# Patient Record
Sex: FEMALE | Race: BLACK OR AFRICAN AMERICAN | NOT HISPANIC OR LATINO
[De-identification: names, ages, dates, MRNs, and addresses within clinical notes are randomized per-mention and may not be internally consistent; named-entity substitution may affect disease eponyms.]

---

## 2021-08-07 ENCOUNTER — TRANSCRIPTION ENCOUNTER (OUTPATIENT)
Age: 51
End: 2021-08-07

## 2023-11-02 ENCOUNTER — EMERGENCY (EMERGENCY)
Facility: HOSPITAL | Age: 53
LOS: 1 days | Discharge: HOME | End: 2023-11-02
Attending: EMERGENCY MEDICINE | Admitting: EMERGENCY MEDICINE
Payer: COMMERCIAL

## 2023-11-02 VITALS
RESPIRATION RATE: 20 BRPM | OXYGEN SATURATION: 98 % | TEMPERATURE: 98 F | SYSTOLIC BLOOD PRESSURE: 125 MMHG | HEART RATE: 66 BPM | DIASTOLIC BLOOD PRESSURE: 74 MMHG

## 2023-11-02 VITALS
HEIGHT: 61 IN | RESPIRATION RATE: 17 BRPM | DIASTOLIC BLOOD PRESSURE: 83 MMHG | HEART RATE: 72 BPM | WEIGHT: 210.1 LBS | SYSTOLIC BLOOD PRESSURE: 160 MMHG | TEMPERATURE: 98 F | OXYGEN SATURATION: 98 %

## 2023-11-02 DIAGNOSIS — R07.89 OTHER CHEST PAIN: ICD-10-CM

## 2023-11-02 DIAGNOSIS — D64.9 ANEMIA, UNSPECIFIED: ICD-10-CM

## 2023-11-02 DIAGNOSIS — Z88.1 ALLERGY STATUS TO OTHER ANTIBIOTIC AGENTS STATUS: ICD-10-CM

## 2023-11-02 DIAGNOSIS — M32.9 SYSTEMIC LUPUS ERYTHEMATOSUS, UNSPECIFIED: ICD-10-CM

## 2023-11-02 PROCEDURE — 85379 FIBRIN DEGRADATION QUANT: CPT

## 2023-11-02 PROCEDURE — 99285 EMERGENCY DEPT VISIT HI MDM: CPT

## 2023-11-02 PROCEDURE — 71045 X-RAY EXAM CHEST 1 VIEW: CPT | Mod: 26

## 2023-11-02 PROCEDURE — 36415 COLL VENOUS BLD VENIPUNCTURE: CPT

## 2023-11-02 PROCEDURE — 99284 EMERGENCY DEPT VISIT MOD MDM: CPT | Mod: 25

## 2023-11-02 PROCEDURE — 80053 COMPREHEN METABOLIC PANEL: CPT

## 2023-11-02 PROCEDURE — 85025 COMPLETE CBC W/AUTO DIFF WBC: CPT

## 2023-11-02 PROCEDURE — 84484 ASSAY OF TROPONIN QUANT: CPT

## 2023-11-02 PROCEDURE — 71045 X-RAY EXAM CHEST 1 VIEW: CPT

## 2023-11-02 RX ORDER — IBUPROFEN 200 MG
600 TABLET ORAL ONCE
Refills: 0 | Status: COMPLETED | OUTPATIENT
Start: 2023-11-02 | End: 2023-11-02

## 2023-11-02 RX ADMIN — Medication 600 MILLIGRAM(S): at 09:35

## 2023-11-02 NOTE — ED ADULT TRIAGE NOTE - CHIEF COMPLAINT QUOTE
Pt presents' with c/o "chest pain", to L ACW since 0300, with radiation to jaw and under left breast per pt. States "it feels like a fluttering or like something is heavy on my chest. Denies any N/V or SOB. Respiratory effort WNL. Recent dx of Lupus 1/2023. Pt presents' with c/o "chest pain", to L ACW since 0300, with radiation to jaw and under left breast per pt. States "it feels like a fluttering or like something is heavy on my chest". Denies any N/V or SOB. Respiratory effort WNL. Recent dx of Lupus 1/2023.

## 2023-11-02 NOTE — ED ADULT NURSE NOTE - NSFALLUNIVINTERV_ED_ALL_ED
Bed/Stretcher in lowest position, wheels locked, appropriate side rails in place/Call bell, personal items and telephone in reach/Instruct patient to call for assistance before getting out of bed/chair/stretcher/Non-slip footwear applied when patient is off stretcher/[place] to call system/Physically safe environment - no spills, clutter or unnecessary equipment/Purposeful proactive rounding/Room/bathroom lighting operational, light cord in reach

## 2023-11-02 NOTE — ED ADULT NURSE REASSESSMENT NOTE - NS ED NURSE REASSESS COMMENT FT1
received pt from night shift- pt is noted to be aox3, able to maintain airway, having non labored breathing, no retractions noted and able to talk in clear full sentences.

## 2023-11-02 NOTE — ED PROVIDER NOTE - PATIENT PORTAL LINK FT
You can access the FollowMyHealth Patient Portal offered by [facility] by registering at the following website: [email or web address]. By joining [facility]’s FollowMyHealth portal, you will also be able to view your health information using other applications (apps) compatible with our system.

## 2023-11-02 NOTE — ED PROVIDER NOTE - NS ED ATTENDING STATEMENT MOD
This was a shared visit with the NGUYỄN. I reviewed and verified the documentation and independently performed the documented:

## 2023-11-02 NOTE — ED PROVIDER NOTE - CLINICAL SUMMARY MEDICAL DECISION MAKING FREE TEXT BOX
53 F pmh SLE (not on meds currently) p/w chest pain since 3am, improved but no resolved.  not exertional and no sob.  on exam vss, well appearing, mild diffuse anterior chest wall ttp, lungs ctab, +s1/2, no LE edema.  pt perc neg.  atypical cp ?msk, less likely cardiac, do not think dissection.  will obtain labs, ekg, cxr, and give motrin    labs wnl, neg ddimer, trop < 6 (3+ hrs of sxs, no need for rpt), ekg no ischemic changes.  cxr no i/e.  improved w/ motrin.  recommend f/u with pmd/cards outpt. discussed strict return parameters

## 2023-11-02 NOTE — ED PROVIDER NOTE - ATTENDING APP SHARED VISIT CONTRIBUTION OF CARE
PT is  a 52yo f, h/o lupus, who p/w chest pain since 3a today. + intermittent cp x few wks, felt different this morning. Described as heaviness with radiation to r shoulder. + fluttering of heart. NO sob, salvador. Sxs improved since onset. NO fever, uri sx's, leg pain, swelling, prolonged immobility... Afebrile. HDS. PE as above. Pt is well appaering. + s1, s2, rrr. LUngs cta b/l. + reproducible ttp to chest wall. No peripheral edema. Pulses intact and equal b/l. EKG showing nsr, no stemi. Trop and d-dimer neg. CXR neg for i/e/chf/widened mediastinum. PERC neg. Do not suspect acs or thromboembolism based on atypical symptoms, lack of risk factors, non ischemic ekg, neg troponin and d dimer. ED evaluation and management discussed with the patient in detail.  Close PMD follow up encouraged.  Strict ED return instructions discussed in detail and patient given the opportunity to ask any questions about their discharge diagnosis and instructions. Patient verbalized understanding.

## 2023-11-02 NOTE — ED ADULT NURSE NOTE - OBJECTIVE STATEMENT
Patient is a 53yoF presenting to the ED with chest pain x 3am. Pt is axox3, spont breathing on RA. Pt with recent dx of lupus, not on meds. States that she is having left sided non radiating chest pain.

## 2023-11-02 NOTE — ED PROVIDER NOTE - PHYSICAL EXAMINATION
Vitals reviewed  Gen: well appearing, nad, speaking in full sentences, no hypoxia   Skin: wwp, no rash/lesions  HEENT: ncat, eomi, mmm  CV: rrr, no audible m/r/g  Chest: mild ttp over anterior chest wall, no crepitus or rash  Resp: symmetrical expansion, ctab, no w/r/r  Abd: nondistended, soft/nt  Ext: FROM throughout, no peripheral edema or calf ttp, 2+ distal pulses  Neuro: alert/oriented, no focal deficits, steady gait

## 2023-11-02 NOTE — ED ADULT NURSE NOTE - CHIEF COMPLAINT QUOTE
Pt presents' with c/o "chest pain", to L ACW since 0300, with radiation to jaw and under left breast per pt. States "it feels like a fluttering or like something is heavy on my chest". Denies any N/V or SOB. Respiratory effort WNL. Recent dx of Lupus 1/2023.

## 2023-11-02 NOTE — ED PROVIDER NOTE - NSFOLLOWUPINSTRUCTIONS_ED_ALL_ED_FT
Please rest and remain well hydrated with plenty of fluids.  You can take motrin 600-800mg and tylenol 650mg every 3 hours, switching between the two for pain/bodyaches or fevers (>100.4F/>38C)    Please call to arrange follow up with primary care doctor within one week    Please call to arrange follow up with primary care doctor within one week    You may call our referrals coordinator at [phone] Monday to Friday 11am-7pm for assistance with making an appointment    Follow up with cardiologist. Can call [phone] ([facility]) to schedule appointment.    Chest Pain    Chest pain can be caused by many different conditions which may or may not be dangerous. Causes include heartburn, lung infections, heart attack, blood clot in lungs, skin infections, strain or damage to muscle, cartilage, or bones, etc. In addition to a history and physical examination, an electrocardiogram (ECG) or other lab tests may have been performed to determine the cause of your chest pain. Follow up with your primary care provider or with a cardiologist as instructed.     SEEK IMMEDIATE MEDICAL CARE IF YOU HAVE ANY OF THE FOLLOWING SYMPTOMS: worsening chest pain, coughing up blood, unexplained back/neck/jaw pain, severe abdominal pain, dizziness or lightheadedness, fainting, shortness of breath, sweaty or clammy skin, vomiting, or racing heart beat. These symptoms may represent a serious problem that is an emergency. Do not wait to see if the symptoms will go away. Get medical help right away. Call 911 and do not drive yourself to the hospital.

## 2023-12-26 PROBLEM — M32.9 SYSTEMIC LUPUS ERYTHEMATOSUS, UNSPECIFIED: Chronic | Status: ACTIVE | Noted: 2023-11-02

## 2024-01-10 ENCOUNTER — APPOINTMENT (OUTPATIENT)
Dept: HEART AND VASCULAR | Facility: CLINIC | Age: 54
End: 2024-01-10

## 2024-02-21 NOTE — ED PROVIDER NOTE - OBJECTIVE STATEMENT
15:30 53 F pmh SLE (not on meds currently) p/w chest pain since 3am.  pt reports diffuse chest heaviness w/ radiating to R shoulder since 3am.  feels "heart fluttering," denies sob.  no change w/ exertion and pain not pleuritic.  reports intermittent cp x 2 weeks but today felt different.  sxs improved since beginning.  did not take anything for pain. fatigue and weakness which is typical since lupus dx- no change.  denies f/c, HA, dizziness, fainting, uri sxs, cough, SHAH, abd pain, nvd, leg pain/swelling, travel, h/o VTE, exogenous hormones, h/o CA, tobacco use, trauma